# Patient Record
Sex: MALE | Race: WHITE
[De-identification: names, ages, dates, MRNs, and addresses within clinical notes are randomized per-mention and may not be internally consistent; named-entity substitution may affect disease eponyms.]

---

## 2018-05-27 NOTE — CT
ABDOMEN CT WITH CONTRAST

PELVIC CT WITH CONTRAST:

 

History: Epigastric pain. Abdominal pain. Right flank pain. 

 

Comparison: 5-21-17

 

Technique: Abdomen and pelvic CT performed with IV contrast. Enteric contrast was not administered. C
oronal reformatted images are submitted for interpretation. 

 

FINDINGS: 

Stable induration of the subcutaneous fat. Stable hypodensity in the midline subcutaneous fat at the 
level of the umbilicus, likely relating to a post-operative fluid collection measuring 4.3 x 1.5 cm. 
Lung bases are clear. Heart size is normal. No significant pericardial fluid. Visualized aorta has a 
normal caliber. No periaortic fat stranding. 

 

There is a hypodensity along the right heart border measuring 3.3 x 3.2 cm, compatible with a pericar
dial cyst, similar to the prior exam. 

 

Portal vein is not assessed due to timing of bolus. The liver, spleen, pancreas, and adrenal glands h
ave appropriate enhancement. 

 

Gallbladder is unremarkable. 

 

Hypodensity in the left kidney cannot be further characterized. Bilaterally, no evidence of nephrolit
hiasis. Punctate, less than 1 mm calcifications, in the left and right renal pelvis. No evidence of h
ydronephrosis or perinephric fat stranding. Bilateral ureters have a normal caliber. No hydroureter, 
periatrial fat stranding or ureterolithiasis. Rotation of the left kidney is again noted. 

 

No mesenteric mass, lymphadenopathy, free air or free fluid. 

 

Note is made of a moderate hiatal hernia with evidence of previous post-surgical change.  Evaluation 
of the alimentary canal is limited by lack of oral contrast. No evidence of bowel obstruction. Multip
le normal caliber small bowel loops are noted. Ileocecal junction appears to be normal. Normal calibe
r appendix. Fecal material throughout a nondistended, nondilated colon. Diverticulosis, without evide
nce of diverticulitis. 

 

PELVIC CT:

No mass, lymphadenopathy, free air or free fluid. The urinary bladder is unremarkable. 

 

No lytic or blastic lesions in the osseous structures. 

 

IMPRESSION: 

1. No acute abnormality in the abdomen or pelvis. 

2. Stable hernia with stable post surgical changes. 

3. Normal caliber appendix. 

4. No evidence of bowel obstruction. There is diverticulosis. No evidence of diverticulitis.  

 

POS: [facility]

## 2019-03-06 ENCOUNTER — HOSPITAL ENCOUNTER (EMERGENCY)
Dept: HOSPITAL 92 - ERS | Age: 49
Discharge: HOME | End: 2019-03-06
Payer: COMMERCIAL

## 2019-03-06 DIAGNOSIS — S52.502A: Primary | ICD-10-CM

## 2019-03-06 DIAGNOSIS — F17.220: ICD-10-CM

## 2019-03-06 DIAGNOSIS — S39.81XA: ICD-10-CM

## 2019-03-06 DIAGNOSIS — Z79.899: ICD-10-CM

## 2019-03-06 DIAGNOSIS — Z79.891: ICD-10-CM
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
## 2019-03-06 DIAGNOSIS — J45.909: ICD-10-CM

## 2019-03-06 PROCEDURE — 29125 APPL SHORT ARM SPLINT STATIC: CPT

## 2019-03-06 PROCEDURE — 96372 THER/PROPH/DIAG INJ SC/IM: CPT

## 2019-03-06 PROCEDURE — 72170 X-RAY EXAM OF PELVIS: CPT

## 2019-03-06 NOTE — RAD
THREE VIEWS LEFT WRIST:

 

Comparison: None. 

 

History: Left wrist pain after fall. 

 

FINDINGS: 

Three views of the left wrist shows a comminuted fracture of the distal radius which appears to be se
en in the radiocarpal joint. The fracture is impacted with foreshortening of the radius compared to t
he ulna. An associated ulnar styloid fracture may also be present. Surrounding soft tissue swelling i
s seen. 

 

IMPRESSION: 

Distal radius and ulnar styloid process fractures. 

 

POS: [facility]

## 2019-03-06 NOTE — RAD
SINGLE VIEW OF THE PELVIS:

 

Comparison: None. 

 

History: Fall with left hip pain. 

 

FINDINGS: 

Anterior view of the pelvis shows no evidence of fracture or dislocation. No degenerative change is s
een in either hip. 

 

IMPRESSION: 

Unremarkable exam. 

 

POS: [facility]

## 2019-03-07 ENCOUNTER — HOSPITAL ENCOUNTER (OUTPATIENT)
Dept: HOSPITAL 92 - LABBT | Age: 49
Discharge: HOME | End: 2019-03-07
Attending: ORTHOPAEDIC SURGERY
Payer: COMMERCIAL

## 2019-03-07 DIAGNOSIS — Z01.818: Primary | ICD-10-CM

## 2019-03-07 DIAGNOSIS — S52.572A: ICD-10-CM

## 2019-03-07 LAB
ANION GAP SERPL CALC-SCNC: 11 MMOL/L (ref 10–20)
BASOPHILS # BLD AUTO: 0 THOU/UL (ref 0–0.2)
BASOPHILS NFR BLD AUTO: 0.3 % (ref 0–1)
BUN SERPL-MCNC: 16 MG/DL (ref 8.9–20.6)
CALCIUM SERPL-MCNC: 9.3 MG/DL (ref 7.8–10.44)
CHLORIDE SERPL-SCNC: 107 MMOL/L (ref 98–107)
CO2 SERPL-SCNC: 29 MMOL/L (ref 22–29)
CREAT CL PREDICTED SERPL C-G-VRATE: 0 ML/MIN (ref 70–130)
EOSINOPHIL # BLD AUTO: 0.2 THOU/UL (ref 0–0.7)
EOSINOPHIL NFR BLD AUTO: 4.1 % (ref 0–10)
GLUCOSE SERPL-MCNC: 83 MG/DL (ref 70–105)
HGB BLD-MCNC: 12.5 G/DL (ref 14–18)
LYMPHOCYTES # BLD: 1.2 THOU/UL (ref 1.2–3.4)
LYMPHOCYTES NFR BLD AUTO: 22.5 % (ref 21–51)
MCH RBC QN AUTO: 29.5 PG (ref 27–31)
MCV RBC AUTO: 93.9 FL (ref 78–98)
MONOCYTES # BLD AUTO: 0.3 THOU/UL (ref 0.11–0.59)
MONOCYTES NFR BLD AUTO: 5.7 % (ref 0–10)
NEUTROPHILS # BLD AUTO: 3.5 THOU/UL (ref 1.4–6.5)
NEUTROPHILS NFR BLD AUTO: 67.5 % (ref 42–75)
PLATELET # BLD AUTO: 184 THOU/UL (ref 130–400)
POTASSIUM SERPL-SCNC: 5.1 MMOL/L (ref 3.5–5.1)
RBC # BLD AUTO: 4.23 MILL/UL (ref 4.7–6.1)
SODIUM SERPL-SCNC: 142 MMOL/L (ref 136–145)
WBC # BLD AUTO: 5.2 THOU/UL (ref 4.8–10.8)

## 2019-03-07 PROCEDURE — 93010 ELECTROCARDIOGRAM REPORT: CPT

## 2019-03-07 PROCEDURE — 80048 BASIC METABOLIC PNL TOTAL CA: CPT

## 2019-03-07 PROCEDURE — 93005 ELECTROCARDIOGRAM TRACING: CPT

## 2019-03-07 PROCEDURE — 85025 COMPLETE CBC W/AUTO DIFF WBC: CPT

## 2019-03-07 NOTE — EKG
Test Reason : 

Blood Pressure : ***/*** mmHG

Vent. Rate : 074 BPM     Atrial Rate : 074 BPM

   P-R Int : 172 ms          QRS Dur : 080 ms

    QT Int : 362 ms       P-R-T Axes : 075 075 053 degrees

   QTc Int : 401 ms

 

Normal sinus rhythm

ST elevation, consider early repolarization

Borderline ECG

Confirmed by YVONNE OLVERA ([id number]) on 3/7/2019 3:54:09 PM

 

Referred By:  IRENE           Confirmed By:YVONNE OLVERA

## 2019-03-08 ENCOUNTER — HOSPITAL ENCOUNTER (OUTPATIENT)
Dept: HOSPITAL 92 - SDC | Age: 49
End: 2019-03-08
Attending: ORTHOPAEDIC SURGERY
Payer: COMMERCIAL

## 2019-03-08 VITALS — BODY MASS INDEX: 31.4 KG/M2

## 2019-03-08 DIAGNOSIS — Z88.0: ICD-10-CM

## 2019-03-08 DIAGNOSIS — S63.512A: ICD-10-CM

## 2019-03-08 DIAGNOSIS — Z88.2: ICD-10-CM

## 2019-03-08 DIAGNOSIS — F17.290: ICD-10-CM

## 2019-03-08 DIAGNOSIS — S52.572A: Primary | ICD-10-CM

## 2019-03-08 DIAGNOSIS — S52.302A: ICD-10-CM
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
## 2019-03-08 DIAGNOSIS — Z98.84: ICD-10-CM

## 2019-03-08 DIAGNOSIS — Z79.899: ICD-10-CM

## 2019-03-08 DIAGNOSIS — Z79.2: ICD-10-CM

## 2019-03-08 DIAGNOSIS — Z98.890: ICD-10-CM

## 2019-03-08 PROCEDURE — C1713 ANCHOR/SCREW BN/BN,TIS/BN: HCPCS

## 2019-03-08 PROCEDURE — 0PSJ04Z REPOSITION LEFT RADIUS WITH INTERNAL FIXATION DEVICE, OPEN APPROACH: ICD-10-PCS | Performed by: ORTHOPAEDIC SURGERY

## 2019-03-08 PROCEDURE — 76000 FLUOROSCOPY <1 HR PHYS/QHP: CPT

## 2019-03-08 NOTE — RAD
TWO VIEWS LEFT WRIST:

 

HISTORY: 

Left wrist pain, open reduction internal fixation.

 

FINDINGS: 

AP and lateral views of the left wrist demonstrate plates and screws in place.  Previously noted Joel
es fracture has been stabilized.  Plate and screws are in good position.

 

IMPRESSION: 

Open reduction internal fixation distal left radial fracture.

 

POS: [facility]

## 2019-03-08 NOTE — OP
DATE OF PROCEDURE:  03/08/2019



PREOPERATIVE DIAGNOSIS:  Displaced intra-articular distal radius fracture, left with

shaft extension. 



PROCEDURE PERFORMED:  Open reduction and internal fixation of left radial shaft and

distal radial intra-articular fracture. 



ASSISTANT:  Fabio.



BLOOD LOSS:  Minimal.



SPECIMENS:  None.



DRAINS:  None.



COMPLICATIONS:  None.



DESCRIPTION OF PROCEDURE:  The patient was taken to the operating room, where

general anesthesia was induced.  Left arm was prepped and draped in a sterile

fashion.  I made an FCR approach.  Dissection was carried down to the fracture.

Fracture was quite displaced and comminuted.  First thing I did, was exposed the

radial shaft and clamped it together and used lag screws to fix the shaft fragments

together.  Once this was done, I aligned the distal radius fracture with the shaft

and placed a 5-hole Synthes plate and screws in the usual fashion.  I obtained a

very good reduction, nearly anatomic.  Biplanar images were obtained.  Irrigation

was performed.  Subcutaneous tissue was closed with 3-0 Vicryl.  Skin was closed

with staples and a sterile dressing was applied.  There were no complications. 







Job ID:  [id number]

## 2019-03-21 ENCOUNTER — HOSPITAL ENCOUNTER (EMERGENCY)
Dept: HOSPITAL 92 - ERS | Age: 49
Discharge: HOME | End: 2019-03-21
Payer: COMMERCIAL

## 2019-03-21 DIAGNOSIS — F17.220: ICD-10-CM

## 2019-03-21 DIAGNOSIS — S32.592A: Primary | ICD-10-CM

## 2019-03-21 DIAGNOSIS — J45.909: ICD-10-CM
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
## 2019-03-21 PROCEDURE — 72131 CT LUMBAR SPINE W/O DYE: CPT

## 2019-03-21 PROCEDURE — 72192 CT PELVIS W/O DYE: CPT

## 2019-03-21 PROCEDURE — 96372 THER/PROPH/DIAG INJ SC/IM: CPT

## 2019-03-21 NOTE — CT
CT OF THE PELVIS WITHOUT IV CONTRAST

3/21/19

 

INDICATION:

History of fall with hip pain.

 

COMPARISON:  

Radiographs of the pelvis dated 3/6/19.

 

IMPRESSION:  

There is some periosteal reaction involving nondisplaced fracture involving the left inferior pubic r
amus. There is also a nondisplaced left pubic body fracture. There is a minimally displaced left pubi
c root fracture. The sacrum appears intact.  There is soft tissue gas overlying the right gluteal reg
ion likely related to prior injection. There is a peripherally calcified hypodense density seen withi
n the lower fascia of the lower abdomen likely related to an area of prior seroma or fat necrosis. Th
ere is a moderate amount of retained stool within the colon. 

 

IMPRESSION:  

Nondisplaced left pubic root, left pubic body and left inferior ramus fracture. 

 

POS: [facility]

## 2019-03-21 NOTE — CT
CT OF LUMBAR SPINE WITHOUT CONTRAST 

3/21/19

 

INDICATION:

History of left hip pain and fall with low back pain. 

 

FINDINGS:  

No definite acute fracture or subluxation is evident. There is vacuum disc phenomenon at L5-S1 likely
 related to disc degenerative disease and disc instability. 

 

There is mild vascular calcification involving the abdominal aorta. There is a stable small left won
l cyst. There is degenerative change involving both SI joints. 

 

There is a broad based bulge and loss of disc space height inducing mild right neural foraminal narro
wing at L5-S1. 

 

There is mild bilateral neural foraminal narrowing at L4-5 due to broad based bulge and facet hypertr
ophy. 

 

There is mild neural foraminal narrowing at L3-4 due to a broad based and facet hypertrophy. 

 

No appreciable osseous central canal or neural foraminal narrowing seen at L1-2 and L2-3.  

 

IMPRESSION:  

1.      No acute fracture or subluxation is evident. 

 

2.      Mild to moderate spondylosis of the lumbar spine. 

 

POS: [facility]

## 2019-08-19 ENCOUNTER — HOSPITAL ENCOUNTER (OUTPATIENT)
Dept: HOSPITAL 92 - RAD | Age: 49
Discharge: HOME | End: 2019-08-19
Attending: INTERNAL MEDICINE
Payer: COMMERCIAL

## 2019-08-19 DIAGNOSIS — R11.2: ICD-10-CM

## 2019-08-19 DIAGNOSIS — K22.8: ICD-10-CM

## 2019-08-19 DIAGNOSIS — R63.4: ICD-10-CM

## 2019-08-19 DIAGNOSIS — R13.10: Primary | ICD-10-CM

## 2019-08-19 DIAGNOSIS — Z98.890: ICD-10-CM

## 2019-08-19 DIAGNOSIS — Z98.84: ICD-10-CM

## 2019-08-19 PROCEDURE — 74220 X-RAY XM ESOPHAGUS 1CNTRST: CPT

## 2019-08-19 NOTE — RAD
Barium swallow esophagram double contrast:



DATE:

8/19/2019



HISTORY:

43-year-old male with dysphagia.



TECHNIQUE:

Upright administration of effervescent granules, thick liquid barium, and barium tablet with water.

Prone DAWSON straw administration of thin liquid barium.



FINDINGS:

Post surgical narrowing of gastric channel. No gastric outlet obstruction.

Moderate sized dilated pouch at the esophagogastric junction with irregular margins, located at midli
ne and slightly superior to the diaphragmatic hiatus. Superior to this, the esophagus has tertiary

contractions. The esophagus is tortuous. When filled with barium and gas, it becomes diffusely dilate
d. Barium tablet passes rapidly into the stomach. No stricture. When supine, the barium reflux into

the dilated pouch, but not significantly into the rest of the esophagus.



IMPRESSION:

1. Status post vertical sleeve gastrectomy.

2. Dilated pouch at esophagogastric junction. Differential diagnosis is small to moderate-sized parae
sophageal hiatal hernia versus pouch dilatation.

3. Esophageal ectasia.

4. Presbyesophagus.

5. No esophageal stricture.



Reported By: Isma Hernandez 

Electronically Signed:  8/19/2019 11:36 AM

## 2020-01-02 ENCOUNTER — HOSPITAL ENCOUNTER (EMERGENCY)
Dept: HOSPITAL 92 - ERS | Age: 50
Discharge: HOME | End: 2020-01-02
Payer: COMMERCIAL

## 2020-01-02 DIAGNOSIS — J45.909: ICD-10-CM

## 2020-01-02 DIAGNOSIS — K40.90: Primary | ICD-10-CM

## 2020-01-02 DIAGNOSIS — Z79.899: ICD-10-CM

## 2020-01-02 DIAGNOSIS — F17.220: ICD-10-CM

## 2020-01-02 PROCEDURE — 99283 EMERGENCY DEPT VISIT LOW MDM: CPT

## 2020-01-31 ENCOUNTER — HOSPITAL ENCOUNTER (OUTPATIENT)
Dept: HOSPITAL 92 - BICCT | Age: 50
Discharge: HOME | End: 2020-01-31
Attending: SURGERY
Payer: COMMERCIAL

## 2020-01-31 DIAGNOSIS — K44.9: ICD-10-CM

## 2020-01-31 DIAGNOSIS — K22.8: ICD-10-CM

## 2020-01-31 DIAGNOSIS — R10.31: Primary | ICD-10-CM

## 2020-01-31 DIAGNOSIS — K57.30: ICD-10-CM

## 2020-01-31 PROCEDURE — 74177 CT ABD & PELVIS W/CONTRAST: CPT

## 2021-11-17 ENCOUNTER — HOSPITAL ENCOUNTER (EMERGENCY)
Dept: HOSPITAL 92 - ERS | Age: 51
Discharge: HOME | End: 2021-11-17
Payer: COMMERCIAL

## 2021-11-17 DIAGNOSIS — J45.909: ICD-10-CM

## 2021-11-17 DIAGNOSIS — B34.9: Primary | ICD-10-CM

## 2021-11-17 DIAGNOSIS — Z20.822: ICD-10-CM

## 2021-11-17 DIAGNOSIS — F17.220: ICD-10-CM

## 2021-11-17 PROCEDURE — 99284 EMERGENCY DEPT VISIT MOD MDM: CPT

## 2021-11-17 PROCEDURE — U0005 INFEC AGEN DETEC AMPLI PROBE: HCPCS

## 2021-11-17 PROCEDURE — 87804 INFLUENZA ASSAY W/OPTIC: CPT

## 2021-11-17 PROCEDURE — U0003 INFECTIOUS AGENT DETECTION BY NUCLEIC ACID (DNA OR RNA); SEVERE ACUTE RESPIRATORY SYNDROME CORONAVIRUS 2 (SARS-COV-2) (CORONAVIRUS DISEASE [COVID-19]), AMPLIFIED PROBE TECHNIQUE, MAKING USE OF HIGH THROUGHPUT TECHNOLOGIES AS DESCRIBED BY CMS-2020-01-R: HCPCS

## 2022-01-31 ENCOUNTER — HOSPITAL ENCOUNTER (OUTPATIENT)
Dept: HOSPITAL 92 - BICRAD | Age: 52
Discharge: HOME | End: 2022-01-31
Attending: NURSE PRACTITIONER
Payer: COMMERCIAL

## 2022-01-31 DIAGNOSIS — K44.9: ICD-10-CM

## 2022-01-31 DIAGNOSIS — R05.9: Primary | ICD-10-CM

## 2022-01-31 PROCEDURE — 71046 X-RAY EXAM CHEST 2 VIEWS: CPT

## 2022-10-06 ENCOUNTER — HOSPITAL ENCOUNTER (OUTPATIENT)
Dept: HOSPITAL 92 - TBSIIMAG | Age: 52
Discharge: HOME | End: 2022-10-06
Attending: NURSE PRACTITIONER
Payer: COMMERCIAL

## 2022-10-06 DIAGNOSIS — M48.07: ICD-10-CM

## 2022-10-06 DIAGNOSIS — M54.16: Primary | ICD-10-CM

## 2022-10-06 DIAGNOSIS — M48.061: ICD-10-CM

## 2022-10-06 PROCEDURE — 72148 MRI LUMBAR SPINE W/O DYE: CPT

## 2022-10-26 ENCOUNTER — HOSPITAL ENCOUNTER (OUTPATIENT)
Dept: HOSPITAL 92 - BICRAD | Age: 52
Discharge: HOME | End: 2022-10-26
Attending: ANESTHESIOLOGY
Payer: COMMERCIAL

## 2022-10-26 DIAGNOSIS — M19.031: ICD-10-CM

## 2022-10-26 DIAGNOSIS — M54.16: ICD-10-CM

## 2022-10-26 DIAGNOSIS — M89.9: ICD-10-CM

## 2022-10-26 DIAGNOSIS — M25.531: Primary | ICD-10-CM

## 2023-03-06 ENCOUNTER — HOSPITAL ENCOUNTER (OUTPATIENT)
Dept: HOSPITAL 92 - LABBT | Age: 53
Discharge: HOME | End: 2023-03-06
Attending: SURGERY
Payer: COMMERCIAL

## 2023-03-06 DIAGNOSIS — Z01.818: Primary | ICD-10-CM

## 2023-03-06 DIAGNOSIS — K43.2: ICD-10-CM

## 2023-03-06 LAB
ALBUMIN SERPL BCG-MCNC: 4.5 G/DL (ref 3.5–5)
ALP SERPL-CCNC: 74 U/L (ref 40–110)
ALT SERPL W P-5'-P-CCNC: 22 U/L (ref 8–55)
ANION GAP SERPL CALC-SCNC: 16 MMOL/L (ref 10–20)
AST SERPL-CCNC: 36 U/L (ref 5–34)
BASOPHILS # BLD AUTO: 0.1 10X3/UL (ref 0–0.2)
BASOPHILS NFR BLD AUTO: 0.9 % (ref 0–2)
BILIRUB SERPL-MCNC: 0.4 MG/DL (ref 0.2–1.2)
BUN SERPL-MCNC: 27 MG/DL (ref 8.4–25.7)
CALCIUM SERPL-MCNC: 9.9 MG/DL (ref 7.8–10.44)
CHLORIDE SERPL-SCNC: 105 MMOL/L (ref 98–107)
CO2 SERPL-SCNC: 24 MMOL/L (ref 22–29)
CREAT CL PREDICTED SERPL C-G-VRATE: 0 ML/MIN (ref 70–130)
EOSINOPHIL # BLD AUTO: 0.1 10X3/UL (ref 0–0.5)
EOSINOPHIL NFR BLD AUTO: 1.4 % (ref 0–6)
GLOBULIN SER CALC-MCNC: 3 G/DL (ref 2.4–3.5)
GLUCOSE SERPL-MCNC: 89 MG/DL (ref 70–105)
HGB BLD-MCNC: 15.3 G/DL (ref 13.5–17.5)
LYMPHOCYTES NFR BLD AUTO: 23.4 % (ref 18–47)
MCH RBC QN AUTO: 29.9 PG (ref 27–33)
MCV RBC AUTO: 91.8 FL (ref 81.2–95.1)
MONOCYTES # BLD AUTO: 0.6 10X3/UL (ref 0–1.1)
MONOCYTES NFR BLD AUTO: 6.6 % (ref 0–10)
NEUTROPHILS # BLD AUTO: 6.3 10X3/UL (ref 1.5–8.4)
NEUTROPHILS NFR BLD AUTO: 67.5 % (ref 40–75)
PLATELET # BLD AUTO: 251 10X3/UL (ref 150–450)
POTASSIUM SERPL-SCNC: 4.7 MMOL/L (ref 3.5–5.1)
RBC # BLD AUTO: 5.12 10X6/UL (ref 4.32–5.72)
SODIUM SERPL-SCNC: 140 MMOL/L (ref 136–145)
WBC # BLD AUTO: 9.4 10X3/UL (ref 3.5–10.5)

## 2023-03-06 PROCEDURE — 93005 ELECTROCARDIOGRAM TRACING: CPT

## 2023-03-06 PROCEDURE — 85025 COMPLETE CBC W/AUTO DIFF WBC: CPT

## 2023-03-06 PROCEDURE — 93010 ELECTROCARDIOGRAM REPORT: CPT

## 2023-03-06 PROCEDURE — 80053 COMPREHEN METABOLIC PANEL: CPT

## 2023-03-17 ENCOUNTER — HOSPITAL ENCOUNTER (OUTPATIENT)
Dept: HOSPITAL 92 - SDC | Age: 53
Discharge: HOME | End: 2023-03-17
Attending: SURGERY
Payer: COMMERCIAL

## 2023-03-17 VITALS — BODY MASS INDEX: 45.6 KG/M2

## 2023-03-17 DIAGNOSIS — Z88.0: ICD-10-CM

## 2023-03-17 DIAGNOSIS — F17.290: ICD-10-CM

## 2023-03-17 DIAGNOSIS — Z88.1: ICD-10-CM

## 2023-03-17 DIAGNOSIS — K40.91: Primary | ICD-10-CM

## 2023-03-17 DIAGNOSIS — G89.29: ICD-10-CM

## 2023-03-17 DIAGNOSIS — Z79.1: ICD-10-CM

## 2023-03-17 DIAGNOSIS — M10.9: ICD-10-CM

## 2023-03-17 DIAGNOSIS — Z88.2: ICD-10-CM

## 2023-03-17 DIAGNOSIS — E66.01: ICD-10-CM

## 2023-03-17 DIAGNOSIS — Z79.899: ICD-10-CM

## 2023-03-17 PROCEDURE — 0YU64JZ SUPPLEMENT LEFT INGUINAL REGION WITH SYNTHETIC SUBSTITUTE, PERCUTANEOUS ENDOSCOPIC APPROACH: ICD-10-PCS | Performed by: SURGERY

## 2023-03-17 PROCEDURE — C1781 MESH (IMPLANTABLE): HCPCS

## 2023-04-19 ENCOUNTER — HOSPITAL ENCOUNTER (OUTPATIENT)
Dept: HOSPITAL 92 - ERS | Age: 53
Setting detail: OBSERVATION
LOS: 1 days | Discharge: LEFT BEFORE DISCHARGE | End: 2023-04-20
Attending: STUDENT IN AN ORGANIZED HEALTH CARE EDUCATION/TRAINING PROGRAM | Admitting: STUDENT IN AN ORGANIZED HEALTH CARE EDUCATION/TRAINING PROGRAM
Payer: COMMERCIAL

## 2023-04-19 VITALS — BODY MASS INDEX: 43.2 KG/M2

## 2023-04-19 DIAGNOSIS — G89.29: ICD-10-CM

## 2023-04-19 DIAGNOSIS — Z88.2: ICD-10-CM

## 2023-04-19 DIAGNOSIS — M54.9: ICD-10-CM

## 2023-04-19 DIAGNOSIS — I12.9: ICD-10-CM

## 2023-04-19 DIAGNOSIS — J45.909: ICD-10-CM

## 2023-04-19 DIAGNOSIS — Z53.29: ICD-10-CM

## 2023-04-19 DIAGNOSIS — Z98.84: ICD-10-CM

## 2023-04-19 DIAGNOSIS — Z88.1: ICD-10-CM

## 2023-04-19 DIAGNOSIS — Z88.0: ICD-10-CM

## 2023-04-19 DIAGNOSIS — F17.220: ICD-10-CM

## 2023-04-19 DIAGNOSIS — I95.9: Primary | ICD-10-CM

## 2023-04-19 DIAGNOSIS — Z79.899: ICD-10-CM

## 2023-04-19 DIAGNOSIS — N18.9: ICD-10-CM

## 2023-04-19 LAB
ALBUMIN SERPL BCG-MCNC: 3.4 G/DL (ref 3.5–5)
ALP SERPL-CCNC: 78 U/L (ref 40–110)
ALT SERPL W P-5'-P-CCNC: 24 U/L (ref 8–55)
ANION GAP SERPL CALC-SCNC: 13 MMOL/L (ref 10–20)
AST SERPL-CCNC: 51 U/L (ref 5–34)
BASOPHILS # BLD AUTO: 0 THOU/UL (ref 0–0.2)
BASOPHILS NFR BLD AUTO: 0.5 % (ref 0–1)
BILIRUB SERPL-MCNC: 0.2 MG/DL (ref 0.2–1.2)
BUN SERPL-MCNC: 32 MG/DL (ref 8.4–25.7)
CALCIUM SERPL-MCNC: 8.6 MG/DL (ref 7.8–10.44)
CHLORIDE SERPL-SCNC: 111 MMOL/L (ref 98–107)
CO2 SERPL-SCNC: 21 MMOL/L (ref 22–29)
CREAT CL PREDICTED SERPL C-G-VRATE: 0 ML/MIN (ref 70–130)
EOSINOPHIL # BLD AUTO: 0.3 THOU/UL (ref 0–0.7)
EOSINOPHIL NFR BLD AUTO: 6 % (ref 0–10)
GLOBULIN SER CALC-MCNC: 2.5 G/DL (ref 2.4–3.5)
GLUCOSE SERPL-MCNC: 144 MG/DL (ref 70–105)
HGB BLD-MCNC: 12 G/DL (ref 14–18)
LYMPHOCYTES # BLD: 1.1 THOU/UL (ref 1.2–3.4)
LYMPHOCYTES NFR BLD AUTO: 21.9 % (ref 21–51)
MAGNESIUM SERPL-MCNC: 2.2 MG/DL (ref 1.6–2.6)
MCH RBC QN AUTO: 32.6 PG (ref 27–31)
MCV RBC AUTO: 96.9 FL (ref 78–98)
MONOCYTES # BLD AUTO: 0.5 THOU/UL (ref 0.11–0.59)
MONOCYTES NFR BLD AUTO: 9.6 % (ref 0–10)
NEUTROPHILS # BLD AUTO: 3.1 THOU/UL (ref 1.4–6.5)
NEUTROPHILS NFR BLD AUTO: 62.1 % (ref 42–75)
PLATELET # BLD AUTO: 147 10X3/UL (ref 130–400)
POTASSIUM SERPL-SCNC: 4.6 MMOL/L (ref 3.5–5.1)
RBC # BLD AUTO: 3.68 MILL/UL (ref 4.7–6.1)
SODIUM SERPL-SCNC: 140 MMOL/L (ref 136–145)
WBC # BLD AUTO: 5 10X3/UL (ref 4.8–10.8)

## 2023-04-19 PROCEDURE — 83735 ASSAY OF MAGNESIUM: CPT

## 2023-04-19 PROCEDURE — 84484 ASSAY OF TROPONIN QUANT: CPT

## 2023-04-19 PROCEDURE — 85025 COMPLETE CBC W/AUTO DIFF WBC: CPT

## 2023-04-19 PROCEDURE — G0378 HOSPITAL OBSERVATION PER HR: HCPCS

## 2023-04-19 PROCEDURE — 80053 COMPREHEN METABOLIC PANEL: CPT

## 2023-04-19 PROCEDURE — 71045 X-RAY EXAM CHEST 1 VIEW: CPT

## 2023-04-19 PROCEDURE — 81003 URINALYSIS AUTO W/O SCOPE: CPT

## 2023-04-19 PROCEDURE — 93005 ELECTROCARDIOGRAM TRACING: CPT

## 2023-04-19 PROCEDURE — 36415 COLL VENOUS BLD VENIPUNCTURE: CPT

## 2023-04-20 VITALS — SYSTOLIC BLOOD PRESSURE: 104 MMHG | DIASTOLIC BLOOD PRESSURE: 76 MMHG

## 2023-04-20 VITALS — TEMPERATURE: 98 F

## 2023-04-20 LAB — SP GR UR STRIP: 1.02 (ref 1–1.04)
